# Patient Record
(demographics unavailable — no encounter records)

---

## 2024-11-04 NOTE — PHYSICAL EXAM
[de-identified] : Lumbar Physical Exam  Ambulating with a walker. Improvement with walking tolerance   Incisions are clean dry and intact  Reflexes Patellar - normal Gastroc - normal Clonus - No  Hip Exam - Normal  Straight leg raise - none  Pulses - 2+ dp/pt  Range of motion - normal  Sensation  Sensation intact to light touch in L1, L2, L3, L4, L5 and S1 dermatomes bilaterally  Motor 	IP	Quad	HS	TA	Gastroc	EHL Right	5/5	5/5	5/5	5/5	5/5	5/5 Left	5/5	5/5	5/5	5/5	5/5	5/5 [de-identified] : Xray lumbar 4 views  Previous imaging Scoliosis Radiographs L4-L5 spody SVA +  Lumbar radiographs Hardware position No acute complication

## 2024-11-04 NOTE — ASSESSMENT
[FreeTextEntry1] : This is an 84-year-old female is approximately 7 months out from surgery.  She is making slow but steady progress in terms of her walking ability.  I will have her follow-up in 4 weeks.  We have prescribed her medications to help with her constipation as well as pelvic floor therapy.  All questions were answered.

## 2024-11-26 NOTE — ASSESSMENT
[FreeTextEntry1] :   Possible UDS Pelvic Avoid constipation foods Consider Plant based meals Increase fiber and probiotic.

## 2024-11-26 NOTE — HISTORY OF PRESENT ILLNESS
[FreeTextEntry1] : PERLITA VELEZ  84 year F presents for follow up. History of urine retention post op - DVT  Back surgery with UTI's requiring IV abx.  Voiding well. Off bethanechol because of Nocturia.  Nocturia - drinks water at night with slight burning.  Accompanied by daughter

## 2025-01-02 NOTE — HISTORY OF PRESENT ILLNESS
AMBULATORY CASE MANAGEMENT NOTE    Names and Relationships of Patient/Support Persons: Contact: Paperwork; Relationship: Other -     Care Coordination    Received signed PAP for [employer], placed in scan pile in front office.    Education Documentation  No documentation found.        MARIANA PABLO  Ambulatory Case Management    1/2/2025, 12:59 EST   [de-identified] : 85 yo female following up 7 months from her L3-L5 TLIF, dos 4/2/2024.   9/16/2024 Patient is a 84 year old female who presents for f/u eval s/p 4.5 months L3-L5TLIF. Patient states overall she is doing well. Continuing with PT 3x a week. She reports of RT LE tightness. She reports she ambulates with use of a walker and occasionally able to ambulate without use of a walker at home. States that she is making good progress with PT. She is able to get up from a seated position well without difficulty. Feels right leg is also getting stronger. Reports urinary frequency and intermittent burning with urination, at night. H/o DVT, she is on a blood thinner. Wearing compression socks. She reports of intermittent constipation.   08/19/24 Patient is a 84 year old female who presents for f/u eval s/p 4.5 months L3-L5TLIF. Patient states overall she is doing well. Continuing with PT 3x a week. Reports LT LE swelling and RT LE heaviness. She also details LE nerve pain at night, lasting a few seconds-minutes. Reports urinary frequency and intermittent burning with urination, at night. H/o DVT, she is on a blood thinner. Wearing compression socks.  States that she is making good progress with PT. She is able to get up from a seated position well without difficulty. Feels right leg is also getting stronger.   05.10.24 Today the patient states that overall she is doing okay.  She is anxious to see her urologist for evaluation of her ability to remove her catheter.  She denies any radicular type pain.  She only occasionally has some back pain for which she takes a tramadol.  She denies any drainage, fevers.  She is walking although she is still dealing with some right quad issues.

## 2025-01-08 NOTE — ADDENDUM
[FreeTextEntry1] :  I, Stephanie Yanes, acted solely as a scribe for Dr. Corey Vanegas MD on this date 01/08/2025    All medical record entries made by the Scribe were at my, Dr. Corey Vanegas MD., direction and personally dictated by me on 01/08/2025 . I have reviewed the chart and agree that the record accurately reflects my personal performance of the history, physical exam, assessment and plan. I have also personally directed, reviewed, and agreed with the chart.

## 2025-01-08 NOTE — ASSESSMENT
[FreeTextEntry1] : This is an 84-year-old female is approximately 9 months out from surgery.  Overall, the patient appears to be doing better. There are no red flag findings on imaging nor are there any red flag findings on clinical exams.  Therefore we will proceed with a course of conservative treatment. This would include physical therapy/home exercise program, Tylenol, NSAIDs as medically indicated.  The patient will follow up with me in approximately 4 weeks.  I encouraged the patient to follow-up sooner if there are any new or worsening symptoms.

## 2025-01-08 NOTE — HISTORY OF PRESENT ILLNESS
[de-identified] : 85 yo female following up 9 months from her L3-L5 TLIF, dos 4/2/2024. She reports that she has stopped attending physical therapy. She states that she has some stiffness and numbness in her right LE, however, she reports that she is able to walk around her house. She states that she is able to keep up her right LE and believes that it has improved since her previous visit. She states that she is still following up for her urination and constipation issues. She reports that she wakes up every 2-3 hours to urinate. Patient states that she has occasional dizziness that began a few days ago. She reports that she has a cold. She is still taking a blood thinner.   11.04.24 85 yo female following up 7 months from her L3-L5 TLIF, dos 4/2/2024.   9/16/2024 Patient is a 84 year old female who presents for f/u eval s/p 4.5 months L3-L5TLIF. Patient states overall she is doing well. Continuing with PT 3x a week. She reports of RT LE tightness. She reports she ambulates with use of a walker and occasionally able to ambulate without use of a walker at home. States that she is making good progress with PT. She is able to get up from a seated position well without difficulty. Feels right leg is also getting stronger. Reports urinary frequency and intermittent burning with urination, at night. H/o DVT, she is on a blood thinner. Wearing compression socks. She reports of intermittent constipation.   08/19/24 Patient is a 84 year old female who presents for f/u eval s/p 4.5 months L3-L5TLIF. Patient states overall she is doing well. Continuing with PT 3x a week. Reports LT LE swelling and RT LE heaviness. She also details LE nerve pain at night, lasting a few seconds-minutes. Reports urinary frequency and intermittent burning with urination, at night. H/o DVT, she is on a blood thinner. Wearing compression socks.  States that she is making good progress with PT. She is able to get up from a seated position well without difficulty. Feels right leg is also getting stronger.   05.10.24 Today the patient states that overall she is doing okay.  She is anxious to see her urologist for evaluation of her ability to remove her catheter.  She denies any radicular type pain.  She only occasionally has some back pain for which she takes a tramadol.  She denies any drainage, fevers.  She is walking although she is still dealing with some right quad issues.

## 2025-01-08 NOTE — PHYSICAL EXAM
[de-identified] : Lumbar Physical Exam  Ambulating with a walker  Reflexes Patellar - normal Gastroc - normal Clonus - No  Hip Exam - Normal  Straight leg raise - none  Pulses - 2+ dp/pt  Range of motion - normal  Sensation  Sensation intact to light touch in L1, L2, L3, L4, L5 and S1 dermatomes bilaterally  Motor 	IP	Quad	HS	TA	Gastroc	EHL Right	5/5	5/5	5/5	5/5	5/5	5/5 Left	5/5	5/5	5/5	5/5	5/5	5/5 [de-identified] : Xray lumbar 4 views  Previous imaging Scoliosis Radiographs L4-L5 spody SVA +  Lumbar radiographs Hardware position No acute complication